# Patient Record
Sex: FEMALE | URBAN - METROPOLITAN AREA
[De-identification: names, ages, dates, MRNs, and addresses within clinical notes are randomized per-mention and may not be internally consistent; named-entity substitution may affect disease eponyms.]

---

## 2023-04-04 ENCOUNTER — OFFICE VISIT (OUTPATIENT)
Dept: URBAN - METROPOLITAN AREA CLINIC 30 | Facility: CLINIC | Age: 48
End: 2023-04-04

## 2023-04-04 DIAGNOSIS — H17.9 CORNEAL SCAR: ICD-10-CM

## 2023-04-04 DIAGNOSIS — H43.813 VITREOUS DEGENERATION, BILATERAL: Primary | ICD-10-CM

## 2023-04-04 PROCEDURE — 99203 OFFICE O/P NEW LOW 30 MIN: CPT

## 2023-04-04 ASSESSMENT — VISUAL ACUITY
OS: 20/20
OD: 20/200

## 2023-04-04 ASSESSMENT — KERATOMETRY
OD: 45.53
OS: 40.69

## 2023-04-04 ASSESSMENT — INTRAOCULAR PRESSURE
OS: 16
OD: 15

## 2023-04-04 NOTE — IMPRESSION/PLAN
Impression: Corneal scar: H17.9. Plan: Limits [facility] OD. Per pt history poor vision since she was a child - reports incident with splinter in eye when she was 8mo old.